# Patient Record
(demographics unavailable — no encounter records)

---

## 2017-04-29 NOTE — UC
Throat Pain/Nasal Jose Francisco HPI





- HPI Summary


HPI Summary: 





3 day history of malaise, myalgias, fatigue. Has been spending her days in bed, 

with increasing cough for the past 2 days. No chest pain, chest feels tight. 

Has used an inhaler in the past. 


Smokes 1/2 ppd. 





- History of Current Complaint


Chief Complaint: UCGeneralIllness


Stated Complaint: HEADACHE/BODY ACHES/COUGH


Time Seen by Provider: 04/29/17 17:47


Hx Obtained From: Patient


Hx Last Menstrual Period: 3 weeks ago, nexplanon


Pregnant?: No


Onset/Duration: Sudden Onset, Lasting Days - 3


Pain Intensity: 8


Pain Scale Used: 0-10 Numeric


Cough: Nonproductive


Associated Signs & Symptoms: Positive: Hoarseness, Sinus Discomfort


Related History: Smoking





- Epiglottits Risk Factors


Epiglottis Risk Factors: Negative





- Allergies/Home Medications


Allergies/Adverse Reactions: 


 Allergies











Allergy/AdvReac Type Severity Reaction Status Date / Time


 


Shellfish-derived Products Allergy Severe SOB, DIZZY Verified 04/29/17 17:09


 


Adhesive Tape Allergy Intermediate SKIN SWELLS Verified 04/29/17 17:09


 


Latex Allergy Intermediate SWELLING , Verified 04/29/17 17:09





   REDNESS  





   AND BURNING  


 


Azithromycin [From Zithromax] Allergy  Vomiting Verified 04/29/17 17:09











Home Medications: 


 Home Medications





Cetirizine* [ZyrTEC 10 MG TAB*] 10 mg PO DAILY 04/29/17 [History Confirmed 04/29 /17]


Etonogestrel IMPLANT(NF) [Implanon (NF)] 68 mg SC ONCE 04/29/17 [History 

Confirmed 04/29/17]











PMH/Surg Hx/FS Hx/Imm Hx





- Additional Past Medical History


Additional PMH: 





chronic PID, aura migraines. 


Endocrine History Of: 


   Denies: Diabetes


Cardiovascular History Of: 


   Denies: Cardiac Disorders


Respiratory History Of: Reports: Asthma - exercised induced





- Surgical History


Surgical History: Yes


Surgery Procedure, Year, and Place: GASTROSCOPE, COLONOSCOPY AFTER HAVING C-

DIFF.





- Family History


Known Family History: Positive: Diabetes, Other - maternal aunt with breast 

cancer, mother with benign meningioma


Family History: no known family history of cardio vascular disorders





- Social History


Occupation: Employed Full-time - .


Lives: With Family - 2 daughters


Alcohol Use: None


Substance Use Type: None


Smoking Status (MU): Heavy Every Day Tobacco Smoker


Type: Cigarettes


Amount Used/How Often: 1/2 PPD


Length of Time of Smoking/Using Tobacco: age 13


Have You Smoked in the Last Year: Yes


Household Exposure Type: Cigarettes





Review of Systems


Constitutional: Fatigue


Skin: Negative


Eyes: Negative


ENT: Negative


Respiratory: Cough


Cardiovascular: Negative


Gastrointestinal: Negative


Genitourinary: Negative


Motor: Negative


Neurovascular: Negative


Musculoskeletal: Myalgia


Neurological: Headache - This headache is not typical of her migraines.


Psychological: Negative


All Other Systems Reviewed And Are Negative: Yes





Physical Exam


Triage Information Reviewed: Yes


Appearance: Ill-Appearing, Pain Distress - mild


Vital Signs: 


 Initial Vital Signs











Temp  99.3 F   04/29/17 17:05


 


Pulse  99   04/29/17 17:05


 


Resp  16   04/29/17 17:05


 


BP  107/66   04/29/17 17:05


 


Pulse Ox  100   04/29/17 17:05











Vital Signs Reviewed: Yes


Eyes: Positive: Conjunctiva Clear, Other: - ZOHAIB


ENT: Positive: Pharynx normal, Pharyngeal erythema, TM dull - bilateral serous 

fluid.


Dental Exam: Normal


Neck: Positive: Supple, Nontender, No Lymphadenopathy


Respiratory: Positive: Lungs clear, Normal breath sounds


Cardiovascular: Positive: RRR, No Murmur


Abdomen Description: Positive: Nontender, No Organomegaly


Musculoskeletal Exam: Normal


Neurological: Positive: Alert, Muscle Tone Normal


Psychological Exam: Normal


Skin Exam: Normal





Throat Pain/Nasal Course/Dx





- Course


Course Of Treatment: augmentin for sinusitis.





- Differential Dx/Diagnosis


Differential Diagnosis/HQI/PQRI: Laryngitis, Pharyngitis, Sinusitis, Tonsillitis


Provider Diagnoses: sinusitis





Discharge





- Discharge Plan


Condition: Stable


Disposition: HOME


Prescriptions: 


Albuterol HFA INHALER* [Ventolin HFA Inhaler*] 2 puff INH Q4H PRN #1 mdi


 PRN Reason: Wheezing


Amoxicillin/Clavulanate TAB* [Augmentin *] 875 mg PO BID #20 tab


Additional Instructions: 


continue use of ibuprofen as needed for headache. 


Hot compresses to the sinuses might help with the facial pain.

## 2017-07-18 NOTE — UC
Ear Complaint HPI





- HPI Summary


HPI Summary: 





Has been having lots of URI/allergy symptoms in the last 2-3 days, had to 

double up on her cetirizine last night and congestion improved. Since this 

morning is feeling lots of fluid and pressure in L ear, some in R ear, pain is 

getting more intense. Has polyps in sinuses, [facility] to [facility].





- History of Current Complaint


Chief Complaint: UCEar


Stated Complaint: BILATERAL EAR COMPLAINT


Time Seen by Provider: 07/18/17 15:51


Hx Obtained From: Patient


Hx Last Menstrual Period: 07/12/17


Pregnant?: No


Onset/Duration: Gradual Onset, Lasting Hours


Severity Initially: Mild


Severity Currently: Moderate


Associated Signs/Symptoms: Positive: Hearing Loss, URI Symptoms





- Allergies/Home Medications


Allergies/Adverse Reactions: 


 Allergies











Allergy/AdvReac Type Severity Reaction Status Date / Time


 


Shellfish-derived Products Allergy Severe SOB, DIZZY Verified 07/18/17 15:56


 


Adhesive Tape Allergy Intermediate SKIN SWELLS Verified 07/18/17 15:56


 


Latex Allergy Intermediate SWELLING , Verified 07/18/17 15:56





   REDNESS  





   AND BURNING  


 


Azithromycin [From Zithromax] Allergy  Vomiting Verified 07/18/17 15:56











Home Medications: 


 Home Medications





Acetaminophen TAB* [Tylenol TAB*] 650 mg PO Q4H PRN 07/18/17 [History Confirmed 

07/18/17]


Cetirizine* [ZyrTEC 10 MG TAB*] 10 mg PO BID 07/18/17 [History Confirmed 07/18/ 17]











PMH/Surg Hx/FS Hx/Imm Hx





- Additional Past Medical History


Additional PMH: 





Had sepsis followed by multi-drug-resistant c-diff in 2013


Respiratory History: Asthma


Psychological History: Anxiety, Depression





- Surgical History


Surgical History: Yes


Surgery Procedure, Year, and Place: GASTROSCOPE, COLONOSCOPY AFTER HAVING C-

DIFF.





- Family History


Known Family History: Positive: Diabetes, Other - maternal aunt with breast 

cancer, mother with benign meningioma


Family History: no known family history of cardio vascular disorders





- Social History


Lives: With Family


Alcohol Use: None


Substance Use Type: None


Smoking Status (MU): Heavy Every Day Tobacco Smoker


Type: Cigarettes


Amount Used/How Often: 1/2 PPD


Length of Time of Smoking/Using Tobacco: age 13


Have You Smoked in the Last Year: Yes


Household Exposure Type: Cigarettes





Review of Systems


Constitutional: Negative


Skin: Negative


Eyes: Negative


ENT: Ear Ache, Nasal Discharge


Respiratory: Negative


Cardiovascular: Negative


Gastrointestinal: Negative


Genitourinary: Negative


Motor: Negative


Neurovascular: Negative


Musculoskeletal: Negative


Neurological: Negative


Psychological: Negative


All Other Systems Reviewed And Are Negative: Yes





Physical Exam


Triage Information Reviewed: Yes


Appearance: Well-Appearing, No Pain Distress, Well-Nourished


Vital Signs: 


 Initial Vital Signs











Temp  99.1 F   07/18/17 15:49


 


Pulse  83   07/18/17 15:49


 


Resp  16   07/18/17 15:49


 


BP  114/67   07/18/17 15:49


 


Pulse Ox  98   07/18/17 15:49











Vital Signs Reviewed: Yes


Eye Exam: Normal


Eyes: Positive: Conjunctiva Clear


ENT: Positive: Hearing grossly normal, Pharynx normal, Nasal congestion, TM 

dull - L, TM red - L


Dental Exam: Normal


Neck exam: Normal


Neck: Positive: Supple, Nontender, No Lymphadenopathy


Respiratory Exam: Normal


Respiratory: Positive: Chest non-tender, Lungs clear, Normal breath sounds, No 

respiratory distress, No accessory muscle use


Cardiovascular Exam: Normal


Cardiovascular: Positive: RRR, No Murmur


Musculoskeletal Exam: Normal


Neurological Exam: Normal


Neurological: Positive: Alert


Psychological Exam: Normal


Skin Exam: Normal





Ear Complaint Course/Dx





- Differential Dx/Diagnosis


Provider Diagnoses: L ear AOM





Discharge





- Discharge Plan


Condition: Stable


Disposition: HOME


Prescriptions: 


Amoxicillin PO (*) [Amoxicillin 875 MG (*)] 875 mg PO BID #10 tab


Patient Education Materials:  Otitis Media (ED)


Referrals: 


Abdulkadir Corado MD [Primary Care Provider] - 


Additional Instructions: 


Call or come back if you develop fever, worsening pain, or drainage from the 

ear.

## 2017-07-24 NOTE — UC
Angella GAINES Rebecca, scribed for Todd Khan MD on 07/24/17 at 2125 .





Respiratory Complaint HPI





- HPI Summary


HPI Summary: 


Pt is a 22 y/o F who presents to [facility] c/o L ear pressure and tinnitus, myalgias

, nasal drainage and small white spots on the throat. Characterizes nasal 

drainage as "chunks." Reports she is concerned about rupture of the L TM as she 

had fluid and a layer of "pink skin" come out. Sx aggravated and alleviated by 

nothing, unchanged by decongestants and steroid nasal spray. Denies abdominal 

pain and nausea. She was seen 7/18 and given an Rx for Amoxicillin for 5 day 

which are not improving sx. 








- History of Current Complaint


Chief Complaint: UCRespiratory


Stated Complaint: RE-CK SINUSES,THROAT


Time Seen by Provider: 07/24/17 21:14


Hx Obtained From: Patient


Hx Last Menstrual Period: 12/1/16


Onset/Duration: Still Present


Severity Currently: Mild


Pain Intensity: 3


Pain Scale Used: 0-10 Numeric


Aggravating Factors: Nothing


Alleviating Factors: Nothing





- Allergies/Home Medications


Allergies/Adverse Reactions: 


 Allergies











Allergy/AdvReac Type Severity Reaction Status Date / Time


 


Shellfish-derived Products Allergy Severe SOB, DIZZY Verified 07/18/17 15:56


 


Adhesive Tape Allergy Intermediate SKIN SWELLS Verified 07/18/17 15:56


 


Latex Allergy Intermediate SWELLING , Verified 07/18/17 15:56





   REDNESS  





   AND BURNING  


 


Azithromycin [From Zithromax] Allergy  Vomiting Verified 07/18/17 15:56


 


Shellfish Allergy Allergy  Wheezing Verified 12/12/16 11:02














PMH/Surg Hx/FS Hx/Imm Hx


Respiratory History: Other


Other Respiratory History: No Hx asthma


GI/ History: Other


Other GI/ History: C. Diff





- Surgical History


Surgical History: Yes


Surgery Procedure, Year, and Place: 15 GI bx r/t c.diff





- Family History


Known Family History: Positive: Other - postive FMH of contusions


   Negative: Cardiac Disease, Hypertension, Diabetes


Family History: no known family history of cardio vascular disorders





- Social History


Alcohol Use: Occasionally


Substance Use Type: None


Smoking Status (MU): Light Every Day Tobacco Smoker


Type: Cigarettes


Amount Used/How Often: 3packs per week


Length of Time of Smoking/Using Tobacco: age 13


Have You Smoked in the Last Year: Yes


Household Exposure Type: Cigarettes





Review of Systems


Constitutional: Negative


Skin: Negative


Eyes: Negative


ENT: Ear Ache - L ear pressure, Nasal Discharge - "Chunks", Other - L ear 

tinnitus; white spots on throat


Respiratory: Negative


Cardiovascular: Negative


Gastrointestinal: Negative


Genitourinary: Negative


Motor: Negative


Neurovascular: Negative


Musculoskeletal: Myalgia


Neurological: Negative


Psychological: Negative


All Other Systems Reviewed And Are Negative: Yes





- Comments


Additional Review of Systems Comments: 


POSITIVE: L ear pressure and tinnitus, myalgias, nasal drainage and small white 

spots on the throat. 


NEGATIVE: Abdominal pain and nausea. 





Physical Exam


Triage Information Reviewed: Yes


Vital Signs: 


 Initial Vital Signs











Temp  98.9 F   07/24/17 21:06


 


Pulse  101   07/24/17 21:06


 


Resp  14   07/24/17 21:06


 


BP  108/68   07/24/17 21:06


 


Pulse Ox  98   07/24/17 21:06











Vital Signs Reviewed: Yes





- Additional Comments


The patient is well-nourished in no acute distress and in no acute pain.





The skin is warm and dry and skin color reflects adequate perfusion.





HEENT:  Tenderness ove rthe L frontal and L maxillary sinuses. The pupils are 

equal and reactive. The conjunctivae are clear and without drainage.  Nares are 

patent and without drainage.  Mouth reveals moist mucous membranes and the 

throat is without erythema and exudate.  The external ears are intact. The left 

TM is erythematous and inflamed. L trachal tenderness in the ear. Little bit of 

tenderness over the maxillary. No swelling in the ear. No drainage in the ear 

canal. No otitis externa. She has boggy looking turbanates in the nose.





Neck is supple with full range of motion, There are no carotid bruits.  There 

is no neck vein distension. She has dome anterior and posterior chain 

adenopathy.





Respiratory: Chest is non-tender.  Lungs are clear to auscultation and breath 

sounds are symmetrical and equal.





Cardiovascular: Hear is regular rate and rhythm.  There is no murmur or rub 

auscultated.   There is no peripheral edema and pulses are symmetrical and 

equal.





Neurological: Patient is alert and oriented to person, place and time.  The 

patient has symmetrical motor strength in all four extremities.  Cranial nerves 

are grossly intact. Deep tendon reflexes are symmetrical and equal in all four 

extremities.





Psychiatric: The patient has an appropriate affect and does not exhibit any 

anxiety or depression. 








 Diagnostic Evaluation





- Laboratory


O2 Sat by Pulse Oximetry: 98





Respiratory Course/Dx





- Course


Course Of Treatment: Pt is a 22 y/o F who presents to [facility] c/o L ear pressure 

and tinnitus, myalgias, nasal drainage and small white spots on the throat. 

Characterizes nasal drainage as "chunks." Sx unchanged by decongestants and 

steroid nasal spray. Denies abdominal pain and nausea. She was seen 7/18 and 

given an Rx for Amoxicillin for 5 day which are not improving sx.  Group A 

Strep rapid negative. She will be D/C to home with Dx of acute otitis media, 

Abx failure; left Sinusitis and pharyngitis with Rx for Augmentin.





- Differential Dx/Diagnosis


Provider Diagnoses: Acute otitis media, Abx failure.  Left Sinusitis.  

Pharyngitis





Discharge





- Discharge Plan


Condition: Stable


Disposition: HOME


Prescriptions: 


Amoxicillin/Clavulanate TAB* [Augmentin *] 875 mg PO BID #20 tab


Patient Education Materials:  Otitis Media (ED), Sinusitis (ED), Pharyngitis (ED

)


Referrals: 


Abdulkadir Corado MD [Primary Care Provider] - 3 Days





The documentation as recorded by the Angella uribe Rebecca accurately 

reflects the service I personally performed and the decisions made by me, Todd Khan MD.

## 2017-08-04 NOTE — UC
Throat Pain/Nasal Jose Francisco HPI





- HPI Summary


HPI Summary: 





pt c/o sore throat X 3 weeks. Pt is currently taking Augmentin for previously 

diagnosed sinus infection and bilateral ear infections





- History of Current Complaint


Stated Complaint: SORE THROAT


Time Seen by Provider: 08/04/17 11:22


Hx Obtained From: Patient


Hx Last Menstrual Period: 07/15/17


Pregnant?: No


Onset/Duration: Gradual Onset, Lasting Weeks - 2, Still Present


Severity: Moderate


Associated Signs & Symptoms: Positive: Dysphagia, Fever





- Epiglottits Risk Factors


Epiglottis Risk Factors: Negative





- Allergies/Home Medications


Allergies/Adverse Reactions: 


 Allergies











Allergy/AdvReac Type Severity Reaction Status Date / Time


 


Shellfish-derived Products Allergy Severe SOB, DIZZY Verified 08/04/17 11:35


 


Adhesive Tape Allergy Intermediate SKIN SWELLS Verified 08/04/17 11:35


 


Latex Allergy Intermediate SWELLING , Verified 08/04/17 11:35





   REDNESS  





   AND BURNING  


 


Azithromycin [From Zithromax] Allergy  Vomiting Verified 08/04/17 11:35


 


Shellfish Allergy Allergy  Wheezing Verified 08/04/17 11:35














PMH/Surg Hx/FS Hx/Imm Hx


Previously Healthy: Yes





- Surgical History


Surgical History: Yes


Surgery Procedure, Year, and Place: 15 GI bx r/t c.diff





- Family History


Known Family History: Positive: Other - postive FMH of contusions


   Negative: Cardiac Disease, Hypertension, Diabetes


Family History: no known family history of cardio vascular disorders





- Social History


Occupation: Employed Full-time


Lives: With Family


Alcohol Use: None


Substance Use Type: None


Smoking Status (MU): Light Every Day Tobacco Smoker


Type: Cigarettes


Amount Used/How Often: 3packs per week


Length of Time of Smoking/Using Tobacco: age 13


Have You Smoked in the Last Year: Yes


Household Exposure Type: Cigarettes





Review of Systems


Constitutional: Fever, Chills, Fatigue


Skin: Negative


Eyes: Negative


ENT: Sore Throat


Respiratory: Negative


Cardiovascular: Negative


Gastrointestinal: Negative


Genitourinary: Negative


Motor: Negative


Neurovascular: Negative


Musculoskeletal: Negative


Neurological: Negative


Psychological: Negative


All Other Systems Reviewed And Are Negative: Yes





Physical Exam


Triage Information Reviewed: Yes


Appearance: Ill-Appearing


Vital Signs: 


 Initial Vital Signs











Temp  100.9 F   08/04/17 11:31


 


Pulse  98   08/04/17 11:31


 


Resp  16   08/04/17 11:31


 


BP  94/62   08/04/17 11:31


 


Pulse Ox  99   08/04/17 11:31











Vital Signs Reviewed: Yes


Eye Exam: Normal


ENT Exam: Other


ENT: Positive: TMs normal - right, TM bulging - Left TM, TM red - left TM, 

Tonsillar swelling, Tonsillar exudate


Dental Exam: Normal


Neck: Positive: Enlarged Nodes @ - bialteral sbmaxillary


Respiratory Exam: Normal


Cardiovascular Exam: Normal


Abdominal Exam: Normal


Musculoskeletal Exam: Normal


Neurological Exam: Normal


Psychological Exam: Normal


Skin Exam: Normal





Throat Pain/Nasal Course/Dx





- Differential Dx/Diagnosis


Differential Diagnosis/HQI/PQRI: Mononucleosis, Pharyngitis, Tonsillitis


Provider Diagnoses: tonsillitis





Discharge





- Discharge Plan


Condition: Stable


Disposition: HOME


Prescriptions: 


DOXYcycline CAP(*) [DOXYcycline 100MG CAP(*)] 100 mg PO BID #20 cap


Lidocaine 2% VISCOUS* [Xylocaine 2% Viscous*] 15 ml SWISH SWAL Q8H PRN #1 btl


 PRN Reason: Pain


Patient Education Materials:  Tonsillitis (ED)


Referrals: 


Abdulkadir Corado MD [Primary Care Provider] - As Soon As Possible

## 2017-11-25 NOTE — UC
Throat Pain/Nasal Jose Francisco HPI





- HPI Summary


HPI Summary: 





23 year old female presents with complains of severe sinus congestion and left 

ear drainage. 





- History of Current Complaint


Stated Complaint: SINUS/EAR/BODY ACHES


Time Seen by Provider: 11/25/17 15:58


Hx Obtained From: Patient


Hx Last Menstrual Period: 07/15/17


Onset/Duration: Sudden Onset


Severity: Moderate





- Allergies/Home Medications


Allergies/Adverse Reactions: 


 Allergies











Allergy/AdvReac Type Severity Reaction Status Date / Time


 


Shellfish-derived Products Allergy Severe SOB, DIZZY Verified 11/25/17 16:05


 


Adhesive Tape Allergy Intermediate SKIN SWELLS Verified 11/25/17 16:05


 


Latex Allergy Intermediate SWELLING , Verified 11/25/17 16:05





   REDNESS  





   AND BURNING  


 


Azithromycin [From Zithromax] Allergy  Vomiting Verified 11/25/17 16:05


 


Shellfish Allergy Allergy  Wheezing Verified 11/25/17 16:05











Home Medications: 


 Home Medications





Ibuprofen TAB* [Advil TAB*] 600 mg PO ONCE 11/25/17 [History Confirmed 11/25/17]


Phenylephrine-Acetaminophen-Gu [Sudafed PE Pressure+Pain+ 5-325-200 mg] 2 tab 

PO ONCE 11/25/17 [History Confirmed 11/25/17]











PMH/Surg Hx/FS Hx/Imm Hx


Previously Healthy: Yes





- Surgical History


Surgical History: Yes


Surgery Procedure, Year, and Place: 15 GI bx r/t c.diff





- Family History


Known Family History: Positive: None, Unknown, Other - postive FMH of contusions


   Negative: Cardiac Disease, Hypertension, Diabetes


Family History: no known family history of cardio vascular disorders





- Social History


Alcohol Use: None


Substance Use Type: None


Smoking Status (MU): Light Every Day Tobacco Smoker


Type: Cigarettes


Amount Used/How Often: 3packs per week


Length of Time of Smoking/Using Tobacco: age 13


Have You Smoked in the Last Year: Yes


Household Exposure Type: Cigarettes





Review of Systems


Constitutional: Negative


Skin: Negative


Eyes: Negative


ENT: Ear Ache, Nasal Discharge, Sinus Congestion, Sinus Pain/Tenderness


Respiratory: Negative


Cardiovascular: Negative


Gastrointestinal: Negative


Genitourinary: Negative


Motor: Negative


Neurovascular: Negative


Musculoskeletal: Negative


Neurological: Negative


Psychological: Negative


All Other Systems Reviewed And Are Negative: Yes





Physical Exam


Triage Information Reviewed: Yes


Vital Signs Reviewed: Yes


Eye Exam: Normal


ENT: Positive: Nasal congestion, Nasal drainage, Sinus tenderness, Other - left 

ear drainage


Dental Exam: Normal


Neck exam: Normal


Neck: Positive: 1


Respiratory Exam: Normal


Cardiovascular Exam: Normal


Abdominal Exam: Normal


Musculoskeletal Exam: Normal


Neurological Exam: Normal


Psychological Exam: Normal


Skin Exam: Normal





Throat Pain/Nasal Course/Dx





- Differential Dx/Diagnosis


Provider Diagnoses: sinusitis.  left ear drainage





Discharge





- Discharge Plan


Condition: Stable


Disposition: HOME


Prescriptions: 


Amoxicillin/Clavulanate TAB* [Augmentin *] 875 mg PO BID #20 tab


Ciproflox/Dexameth OTIC.SUSP* [Ciprodex OTIC.SUSP*] 1 drop RIGHT EAR BID #1 

bottle


Methylprednisolone [Medrol Dosepak 4 MG*] 4 mg PO .SEE [place] INSTRUCTION #21 tab


Patient Education Materials:  Sinusitis (ED)


Referrals: 


Cryer,Jonathan, MD [Medical Doctor] - 


Abdulkadir Corado MD [Primary Care Provider] -

## 2017-12-26 NOTE — UC
Eye Complaint HPI





- HPI Summary


HPI Summary: 


had uri--now has purulent drainage from both eyes and injected conjunctiva








- History of Current Complaint


Chief Complaint: UCEye


Stated Complaint: POSS. PINK EYE


Time Seen by Provider: 12/26/17 14:06


Hx Obtained From: Patient


Hx Last Menstrual Period: 12/07/2017


Pregnant?: No


Onset/Duration: Sudden Onset, Lasting Days, Still Present


Timing: Constant


Severity Initially: Mild


Severity Currently: Mild


Location of Injury: Conjunctiva - ou


Aggravating Factor(s): Nothing


Alleviating Factor(s): Nothing


Associated Signs And Symptoms: Positive: Drainage (Purulent) - ou





- Allergies/Home Medications


Allergies/Adverse Reactions: 


 Allergies











Allergy/AdvReac Type Severity Reaction Status Date / Time


 


Shellfish-derived Products Allergy Severe SOB, DIZZY Verified 12/26/17 14:18


 


Adhesive Tape Allergy Intermediate SKIN SWELLS Verified 12/26/17 14:18


 


Latex Allergy Intermediate SWELLING , Verified 12/26/17 14:18





   REDNESS  





   AND BURNING  


 


Azithromycin [From Zithromax] Allergy  Vomiting Verified 11/25/17 16:05


 


Shellfish Allergy Allergy  Wheezing Verified 12/26/17 14:18














PMH/Surg Hx/FS Hx/Imm Hx


Previously Healthy: Yes





- Surgical History


Surgical History: Yes


Surgery Procedure, Year, and Place: 15 GI bx r/t c.diff





- Family History


Known Family History: Positive: None, Unknown, Other - postive FMH of contusions


   Negative: Cardiac Disease, Hypertension, Diabetes


Family History: no known family history of cardio vascular disorders





- Social History


Occupation: Employed Full-time


Lives: With Family


Alcohol Use: None


Substance Use Type: None


Smoking Status (MU): Light Every Day Tobacco Smoker


Type: Cigarettes


Amount Used/How Often: 3packs per week


Length of Time of Smoking/Using Tobacco: age 13


Have You Smoked in the Last Year: Yes


Household Exposure Type: Cigarettes





- Immunization History


Most Recent Influenza Vaccination: none





Review of Systems


Constitutional: Negative


Skin: Negative


Eyes: Drainage - ou, Eye Redness - ou


ENT: Negative


Respiratory: Negative


Cardiovascular: Negative


Gastrointestinal: Negative


Genitourinary: Negative


Motor: Negative


Neurovascular: Negative


Musculoskeletal: Negative


Neurological: Negative


Psychological: Negative


Is Patient Immunocompromised?: No


All Other Systems Reviewed And Are Negative: Yes





Physical Exam


Triage Information Reviewed: Yes


Appearance: Well-Appearing, No Pain Distress, Well-Nourished


Vital Signs: 


 Initial Vital Signs











Temp  98.1 F   12/26/17 14:12


 


Pulse  68   12/26/17 14:12


 


Resp  18   12/26/17 14:12


 


BP  107/85   12/26/17 14:12


 


Pulse Ox  100   12/26/17 14:12











Vital Signs Reviewed: Yes


Eye Exam: Normal


Eyes: Positive: Conjunctiva Inflamed - ou, Discharge - ou


ENT Exam: Normal


ENT: Positive: Normal ENT inspection, Hearing grossly normal, TMs normal.  

Negative: Nasal congestion, Tonsillar swelling, Tonsillar exudate, Trismus, 

Muffled voice, Hoarse voice, Dental tenderness, Sinus tenderness


Dental Exam: Normal


Neck exam: Normal


Neck: Positive: Supple, Nontender


Respiratory Exam: Normal


Respiratory: Positive: Chest non-tender, Lungs clear, No accessory muscle use


Cardiovascular Exam: Normal


Cardiovascular: Positive: RRR, Pulses Normal, Brisk Capillary Refill


Musculoskeletal Exam: Normal


Musculoskeletal: Positive: Strength Intact, ROM Intact, No Edema


Neurological Exam: Normal


Neurological: Positive: Alert, Muscle Tone Normal


Psychological Exam: Normal


Skin Exam: Normal





Eye Complaint Course/Dx





- Course


Course Of Treatment: polytrim, warm soaks, follow with pcpprn





- Differential Dx/Diagnosis


Provider Diagnoses: ou conjuctivitis





Discharge





- Discharge Plan


Condition: Stable


Disposition: HOME


Prescriptions: 


Polymyx/Trimethoprim OPTH* [Polytrim OPHTH*] 1 drop BOTH EYES Q4H #1 btl


Patient Education Materials:  How to Use Eye Drops (ED), Conjunctivitis (ED)


Referrals: 


Abdulkadir Corado MD [Primary Care Provider] - If Needed

## 2018-05-15 NOTE — UC
Complaint Female HPI





- HPI Summary


HPI Summary: 





Pt c/o of nausea and vomiting X 4 weeks.  Pt had positive pregnancy test at 

home and has been "spotting" X 2 days that has since resolve.  Pt had implanon 

removed in October 2017 and is actively trying to get pregnant. 





- History Of Current Complaint


Chief Complaint: UCGeneralIllness


Stated Complaint: CRAMPS DURING PREGNANCY


Time Seen by Provider: 05/15/18 13:04


Hx Obtained From: Patient


Hx Last Menstrual Period: 4/1/18


Pregnant?: Yes - a per home pregnancy test


Onset/Duration: Sudden Onset, Lasting Weeks, Still Present


Timing: Intermittent


Severity Initially: Mild


Severity Currently: None


Pain Intensity: 0


Aggravating Factor(s): Nothing


Alleviating Factor(s): Nothing


Associated Signs And Symptoms: Positive: Vaginal Bleeding/Discharge





- Risk Factors


Ectopic Pregnancy Risk Factor: Negative


Ovarian Torsion Risk Factor: Reproductive Age





- Allergies/Home Medications


Allergies/Adverse Reactions: 


 Allergies











Allergy/AdvReac Type Severity Reaction Status Date / Time


 


Adhesive Tape Allergy Intermediate SKIN SWELLS Verified 05/15/18 13:06


 


azithromycin Allergy  Vomiting Verified 05/15/18 13:06


 


latex Allergy  Swelling Verified 05/15/18 13:06


 


shellfish derived Allergy  Wheezing Verified 05/15/18 13:06











Home Medications: 


 Home Medications





Melatonin/Pyridoxine HCl (B6) [Melatonin 10 mg Tablet] 1 tab PO BEDTIME 05/15/

18 [History Confirmed 05/15/18]


Sertraline* [Zoloft*] 200 mg PO DAILY 05/15/18 [History Confirmed 05/15/18]











PMH/Surg Hx/FS Hx/Imm Hx


Previously Healthy: Yes





- Surgical History


Surgical History: Yes


Surgery Procedure, Year, and Place: 15 GI bx r/t c.diff





- Family History


Known Family History: Positive: None, Unknown, Other - postive FMH of contusions


   Negative: Cardiac Disease, Hypertension, Diabetes


Family History: no known family history of cardio vascular disorders





- Social History


Occupation: Employed Full-time


Lives: With Family


Alcohol Use: None


Substance Use Type: None


Smoking Status (MU): Heavy Every Day Tobacco Smoker


Type: Cigarettes


Amount Used/How Often: 1/2 PPD


Length of Time of Smoking/Using Tobacco: age 13


Have You Smoked in the Last Year: Yes


Household Exposure Type: Cigarettes





- Immunization History


Most Recent Influenza Vaccination: none





Review of Systems


Constitutional: Fatigue


Skin: Negative


Eyes: Negative


ENT: Negative


Respiratory: Negative


Cardiovascular: Negative


Gastrointestinal: Vomiting, Nausea


Genitourinary: Vaginal/Penile Discharge, Abnormal Bleeding


Motor: Negative


Neurovascular: Negative


Musculoskeletal: Negative


Neurological: Negative


Psychological: Negative


Is Patient Immunocompromised?: No


All Other Systems Reviewed And Are Negative: Yes





Physical Exam


Triage Information Reviewed: Yes


Appearance: Well-Appearing


Vital Signs: 


 Initial Vital Signs











Temp  98.4 F   05/15/18 13:07


 


Pulse  84   05/15/18 13:07


 


Resp  18   05/15/18 13:07


 


BP  126/80   05/15/18 13:07


 


Pulse Ox  100   05/15/18 13:07











Vital Signs Reviewed: Yes


Eye Exam: Normal


ENT: Positive: Hearing grossly normal


Neck exam: Normal


Respiratory: Positive: No respiratory distress


Musculoskeletal Exam: Normal


Neurological Exam: Normal


Psychological Exam: Normal


Skin Exam: Normal





Diagnostics





- Laboratory


Diagnostic Studies Completed/Ordered: Urine pregnancy: negative





 Complaint Female Dx





- Course


Course Of Treatment: I discussed with the pt the need to follow up with her PCP 

and gyn provider as soon as possible. Pt verbalized understanding and agreed to 

plan of care.





- Differential Dx/Diagnosis


Differential Diagnosis/HQI/PQRI: Pregnancy, Urinary Tract Infection


Provider Diagnoses: abnormal vaginal bleeding.  nausea and vomiting





Discharge





- Sign-Out/Discharge


Documenting (check all that apply): Discharge/Admit/Transfer





- Discharge Plan


Condition: Stable


Disposition: HOME


Prescriptions: 


Metoclopramide TAB* [Reglan TAB*] 5 mg PO Q8H PRN #15 tab


 PRN Reason: Nausea


Patient Education Materials:  Dysfunctional Uterine Bleeding (ED), Acute Nausea 

and Vomiting (ED)


Referrals: 


Abdulkadir Corado MD [Primary Care Provider] - As Soon As Possible





- Billing Disposition and Condition


Condition: STABLE


Disposition: HOME

## 2018-09-27 NOTE — UC
Throat Pain/Nasal [place] HPI





- HPI Summary


HPI Summary: 





24 year old female with history of C diff presents with URI Sx . Cough onset 7 

days, colored drainage, and sputum; chills. Has nasal polyps and was advised to 

get ENT surgery but declined due to anxiety. Has had Sx 7 or more days and Sx 

not improved and sinus pressure worsened, has had c diff 5 years ago and not 

since. works at [employer]. not uaing nasal sprays. 





- History of Current Complaint


Chief Complaint: UCRespiratory


Stated Complaint: COUGH,CONGESTION


Time Seen by Provider: 09/27/18 13:24


Hx Obtained From: Patient


Hx Last Menstrual Period: early 9/18


Onset/Duration: Sudden Onset


Pain Intensity: 3


Cough: Productive


Associated Signs & Symptoms: Positive: Sinus Discomfort, Nasal Discharge





- Allergies/Home Medications


Allergies/Adverse Reactions: 


 Allergies











Allergy/AdvReac Type Severity Reaction Status Date / Time


 


Adhesive Tape Allergy Intermediate SKIN SWELLS Verified 09/27/18 13:29


 


azithromycin Allergy  Vomiting Verified 09/27/18 13:29


 


latex Allergy  Swelling Verified 09/27/18 13:29


 


shellfish derived Allergy  Wheezing Verified 09/27/18 13:29











Home Medications: 


 Home Medications





Citalopram TAB* [CeleXA TAB*] 30 mg PO DAILY 09/27/18 [History Confirmed 09/27/ 18]


D-Methorphan/PE/Acetaminophen [Vicks Dayquil Cold & Flu] 2 cap PO ONCE PRN 09/27 /18 [History Confirmed 09/27/18]


clonazePAM TAB(*) [KlonoPIN TAB(*)] 0.5 mg PO TID 09/27/18 [History Confirmed 09 /27/18]


clonazePAM [Klonopin] 0.5 mg PO ONCE PRN 09/27/18 [History Confirmed 09/27/18]











PMH/Surg Hx/FS Hx/Imm Hx


Previously Healthy: Yes


Psychological History: Anxiety, Depression





- Surgical History


Surgical History: Yes


Surgery Procedure, Year, and Place: 15 GI bx r/t c.diff





- Family History


Known Family History: Positive: None, Unknown, Other - postive FMH of contusions


   Negative: Cardiac Disease, Hypertension, Diabetes


Family History: no known family history of cardio vascular disorders





- Social History


Occupation: Employed Full-time


Lives: With Family


Alcohol Use: None


Substance Use Type: Prescribed


Smoking Status (MU): Heavy Every Day Tobacco Smoker


Type: Cigarettes


Amount Used/How Often: 1/2 PPD


Length of Time of Smoking/Using Tobacco: age 13


Have You Smoked in the Last Year: Yes


Household Exposure Type: Cigarettes


Cessation Counseling: Patient Advised to Stop





- Immunization History


Most Recent Influenza Vaccination: none





Review of Systems


Constitutional: Fever, Chills, Fatigue


ENT: Sore Throat, Ear Ache, Nasal Discharge, Sinus Congestion, Sinus Pain/

Tenderness


Respiratory: Cough


Psychological: Anxious


Is Patient Immunocompromised?: No


All Other Systems Reviewed And Are Negative: Yes





Physical Exam


Triage Information Reviewed: Yes


Appearance: Well-Appearing, No Pain Distress, Well-Nourished


Vital Signs: 


 Initial Vital Signs











Temp  98.3 F   09/27/18 13:16


 


Pulse  86   09/27/18 13:16


 


Resp  18   09/27/18 13:16


 


BP  122/71   09/27/18 13:16


 


Pulse Ox  99   09/27/18 13:16











Vital Signs Reviewed: Yes


Eye Exam: Normal


ENT Exam: Normal


ENT: Positive: Nasal congestion, Nasal drainage, TM dull, Sinus tenderness.  

Negative: TM bulging, TM red, Tonsillar swelling, Tonsillar exudate


Dental Exam: Normal


Neck exam: Normal


Neck: Positive: 1


Respiratory Exam: Normal


Respiratory: Positive: Chest non-tender, Lungs clear, Normal breath sounds, No 

respiratory distress


Cardiovascular Exam: Normal


Abdominal Exam: Normal


Musculoskeletal Exam: Normal


Neurological Exam: Normal


Psychological Exam: Normal


Skin Exam: Normal





Throat Pain/Nasal Course/Dx





- Course


Course Of Treatment: supportive care at this time with OTC meds, flonase and 

consider Netti Pot. RTO if any concerns. If Sx wprsen or persit start abx and 

aware of SE like c diff and she will use probiotics if needed. advise to go 

back to ENT and PCP.





- Differential Dx/Diagnosis


Differential Diagnosis/HQI/PQRI: Laryngitis, Otitis Media, Pharyngitis, 

Tonsillitis, URI


Provider Diagnoses: Sinusitis





Discharge





- Sign-Out/Discharge


Documenting (check all that apply): Patient Departure


All imaging exams completed and their final reports reviewed: No Studies





- Discharge Plan


Condition: Good


Disposition: HOME


Prescriptions: 


Amoxicillin/Clavulanate TAB* [Augmentin *] 875 mg PO BID 7 Days #14 tab


Fluticasone NASAL SPRAY 50MCG* [Flonase NASAL SPRAY 50MCG*] 2 spray BOTH NARES 

DAILY #1 btl


Patient Education Materials:  Sinusitis (ED)


Referrals: 


Abdulkadir Corado MD [Primary Care Provider] - 4 Days


Additional Instructions: 


VIRAL SINUSITIS:


IT APPEARS THAT AT THIS TIME YOU HAVE A VIRAL SINUSITIS AND ARE ADVISED TO 

CONTINUE WITH DECONGESTANTS, FLONASE AND PLEASE ADD THE URBAN MED SINUS RINSE / 

NETTI POT TO YOUR REGIMEN AND IF YOUR SYMPTOMS WORSEN OVER THE NEXT 3-4 DAYS  

THEN YOU MAY START THE ANTIBIOTICS . please be aware of side effects of 

antibiotics like c diff. Feel better soon !





- Billing Disposition and Condition


Condition: GOOD


Disposition: Home